# Patient Record
Sex: FEMALE
[De-identification: names, ages, dates, MRNs, and addresses within clinical notes are randomized per-mention and may not be internally consistent; named-entity substitution may affect disease eponyms.]

---

## 2018-06-11 ENCOUNTER — HOSPITAL ENCOUNTER (EMERGENCY)
Dept: HOSPITAL 42 - ED | Age: 28
Discharge: HOME | End: 2018-06-11
Payer: COMMERCIAL

## 2018-06-11 VITALS — OXYGEN SATURATION: 100 % | RESPIRATION RATE: 18 BRPM

## 2018-06-11 VITALS — TEMPERATURE: 98.2 F | DIASTOLIC BLOOD PRESSURE: 72 MMHG | HEART RATE: 80 BPM | SYSTOLIC BLOOD PRESSURE: 125 MMHG

## 2018-06-11 DIAGNOSIS — N39.0: Primary | ICD-10-CM

## 2018-06-11 LAB
ALBUMIN SERPL-MCNC: 4.9 G/DL (ref 3–4.8)
ALBUMIN/GLOB SERPL: 1.4 {RATIO} (ref 1.1–1.8)
ALT SERPL-CCNC: 34 U/L (ref 7–56)
APPEARANCE UR: CLEAR
AST SERPL-CCNC: 50 U/L (ref 14–36)
BACTERIA #/AREA URNS HPF: (no result) /[HPF]
BASOPHILS # BLD AUTO: 0.01 K/MM3 (ref 0–2)
BASOPHILS NFR BLD: 0.1 % (ref 0–3)
BILIRUB UR-MCNC: NEGATIVE MG/DL
BUN SERPL-MCNC: 6 MG/DL (ref 7–21)
CALCIUM SERPL-MCNC: 9.5 MG/DL (ref 8.4–10.5)
COLOR UR: YELLOW
EOSINOPHIL # BLD: 0 10*3/UL (ref 0–0.7)
EOSINOPHIL NFR BLD: 0.3 % (ref 1.5–5)
ERYTHROCYTE [DISTWIDTH] IN BLOOD BY AUTOMATED COUNT: 13.5 % (ref 11.5–14.5)
GFR NON-AFRICAN AMERICAN: > 60
GLUCOSE UR STRIP-MCNC: NEGATIVE MG/DL
GRANULOCYTES # BLD: 5.09 10*3/UL (ref 1.4–6.5)
GRANULOCYTES NFR BLD: 57.5 % (ref 50–68)
HGB BLD-MCNC: 13.7 G/DL (ref 12–16)
LEUKOCYTE ESTERASE UR-ACNC: (no result) LEU/UL
LIPASE SERPL-CCNC: 35 U/L (ref 23–300)
LYMPHOCYTES # BLD: 3.4 10*3/UL (ref 1.2–3.4)
LYMPHOCYTES NFR BLD AUTO: 38.1 % (ref 22–35)
MCH RBC QN AUTO: 26.2 PG (ref 25–35)
MCHC RBC AUTO-ENTMCNC: 32.4 G/DL (ref 31–37)
MCV RBC AUTO: 80.9 FL (ref 80–105)
MONOCYTES # BLD AUTO: 0.4 10*3/UL (ref 0.1–0.6)
MONOCYTES NFR BLD: 4 % (ref 1–6)
PH UR STRIP: 6 [PH] (ref 4.7–8)
PLATELET # BLD: 335 10^3/UL (ref 120–450)
PMV BLD AUTO: 10.4 FL (ref 7–11)
PROT UR STRIP-MCNC: NEGATIVE MG/DL
RBC # BLD AUTO: 5.23 10^6/UL (ref 3.5–6.1)
RBC # UR STRIP: NEGATIVE /UL
RBC #/AREA URNS HPF: (no result) /HPF (ref 0–2)
SP GR UR STRIP: <= 1.005 (ref 1–1.03)
UROBILINOGEN UR STRIP-ACNC: 0.2 E.U./DL
WBC # BLD AUTO: 8.9 10^3/UL (ref 4.5–11)

## 2018-06-11 PROCEDURE — 96375 TX/PRO/DX INJ NEW DRUG ADDON: CPT

## 2018-06-11 PROCEDURE — 96374 THER/PROPH/DIAG INJ IV PUSH: CPT

## 2018-06-11 PROCEDURE — 83735 ASSAY OF MAGNESIUM: CPT

## 2018-06-11 PROCEDURE — 83690 ASSAY OF LIPASE: CPT

## 2018-06-11 PROCEDURE — 85025 COMPLETE CBC W/AUTO DIFF WBC: CPT

## 2018-06-11 PROCEDURE — 99284 EMERGENCY DEPT VISIT MOD MDM: CPT

## 2018-06-11 PROCEDURE — 81001 URINALYSIS AUTO W/SCOPE: CPT

## 2018-06-11 PROCEDURE — 87086 URINE CULTURE/COLONY COUNT: CPT

## 2018-06-11 PROCEDURE — 80053 COMPREHEN METABOLIC PANEL: CPT

## 2018-06-11 PROCEDURE — 84100 ASSAY OF PHOSPHORUS: CPT

## 2018-06-11 NOTE — ED PDOC
Arrival/HPI





- General


Chief Complaint: GI Problem


Time Seen by Provider: 06/11/18 11:08


Historian: Patient





- History of Present Illness


Narrative History of Present Illness (Text): 


06/11/18 11:20


27 year old female presents to the Emergency department complaining of nausea 

for 3 days. Nausea is worsened when the patient eats. Patient also complains of 

associated lightheadedness and fatigue. Last menstrual period started 06/01 but 

the patient reports it was shorter than usual. Patient denies any abdominal pain

, fever, chills, chest pain, shortness of breath, vomiting, diarrhea, urinary 

symptoms, back pain, neck pain, or any other complaints.





06/11/18 13:47





Time/Duration: < week (3 days)


Symptom Onset: Gradual


Symptom Course: Unchanged


Context: Home





Past Medical History





- Provider Review


Nursing Documentation Reviewed: Yes





- Psychiatric


Hx Substance Use: No





Family/Social History





- Physician Review


Nursing Documentation Reviewed: Yes


Family/Social History: Unknown Family HX


Smoking Status: Never Smoked


Hx Alcohol Use: No


Hx Substance Use: No





Allergies/Home Meds


Allergies/Adverse Reactions: 


Allergies





No Known Allergies Allergy (Verified 06/11/18 10:39)


 











Review of Systems





- Review of Systems


Constitutional: Fatigue.  absent: Weight Change, Fevers, Night Sweats


Eyes: absent: Vision Changes


ENT: absent: Sore Throat, Rhinorrhea


Respiratory: absent: SOB, Cough


Cardiovascular: absent: Chest Pain


Gastrointestinal: Nausea.  absent: Abdominal Pain, Constipation, Diarrhea, 

Vomiting


Genitourinary Female: absent: Dysuria, Vaginal Bleeding, Vaginal Discharge


Musculoskeletal: absent: Back Pain, Neck Pain


Skin: absent: Rash


Neurological: Other (lightheaded).  absent: Headache, Dizziness, Focal Weakness

, Gait Changes, Speech Changes, Facial Droop, Disequilibrium


Endocrine: absent: Diaphoresis


Psychiatric: absent: Anxiety, Depression





Physical Exam


Vital Signs Reviewed: Yes


Vital Signs











  Temp Pulse Resp BP Pulse Ox


 


 06/11/18 10:36  98.3 F  74  18  121/79  100











Temperature: Afebrile


Blood Pressure: Normal


Pulse: Regular


Respiratory Rate: Normal


Appearance: Positive for: Well-Appearing, Non-Toxic, Comfortable


Pain Distress: None


Mental Status: Positive for: Alert and Oriented X 3





- Systems Exam


Head: Present: Atraumatic, Normocephalic


Pupils: Present: PERRL


Extroacular Muscles: Present: EOMI


Conjunctiva: Present: Normal


Ears: Present: Normal, NORMAL TM.  No: Erythema


Mouth: Present: Moist Mucous Membranes


Pharnyx: Present: Normal.  No: ERYTHEMA, EXUDATE


Neck: Present: Normal Range of Motion


Respiratory/Chest: Present: Clear to Auscultation, Good Air Exchange.  No: 

Respiratory Distress, Accessory Muscle Use


Cardiovascular: Present: Regular Rate and Rhythm, Normal S1, S2.  No: Murmurs


Abdomen: No: Tenderness, Distention, Peritoneal Signs


Back: Present: Normal Inspection


Upper Extremity: Present: Normal Inspection.  No: Cyanosis, Edema


Lower Extremity: Present: Normal Inspection.  No: Edema


Neurological: Present: GCS=15, CN II-XII Intact, Speech Normal


Skin: Present: Warm, Dry, Normal Color.  No: Rashes


Psychiatric: Present: Alert, Oriented x 3, Normal Insight, Normal Concentration





Medical Decision Making


ED Course and Treatment: 


06/11/18 11:25


Impression:


27 year old female presents to the Emergency department complaining of nausea 

for 3 days.





Plan:


-- Urinalysis


-- Labs


-- Pepcid, Zofran, and Sodium Chloride IV fluids


-- Reassess and disposition





Progress Notes:





06/11/18 13:09


Symptoms improved after IVF.  Now complaining of occasional dysuria.  Will dc 

with antibiotics.  She was instructed to follow-up with GI.  Abdomen soft NT/ND 

on reevaluation


06/11/18 13:46








- Lab Interpretations


Lab Results: 








 06/11/18 11:30 





 06/11/18 11:30 





 Lab Results





06/11/18 11:30: Sodium 143, Potassium 4.4, Chloride 105, Carbon Dioxide 24, 

Anion Gap 18, BUN 6 L, Creatinine 0.6 L, Est GFR ( Amer) > 60, Est GFR (

Non-Af Amer) > 60, Random Glucose 96, Calcium 9.5, Phosphorus 2.7, Magnesium 2.2

, Total Bilirubin 0.4, AST 50 H, ALT 34, Alkaline Phosphatase 76, Total Protein 

8.3, Albumin 4.9 H, Globulin 3.4, Albumin/Globulin Ratio 1.4, Lipase 35


06/11/18 11:30: Urine Color Yellow, Urine Appearance Clear, Urine pH 6.0, Ur 

Specific Gravity <= 1.005, Urine Protein Negative, Urine Glucose (UA) Negative, 

Urine Ketones Negative, Urine Blood Negative, Urine Nitrate Negative, Urine 

Bilirubin Negative, Urine Urobilinogen 0.2, Ur Leukocyte Esterase Trace H, 

Urine RBC 0 - 2, Urine WBC 2 - 5, Ur Epithelial Cells 4 - 5, Urine Bacteria Few


06/11/18 11:30: WBC 8.9, RBC 5.23, Hgb 13.7, Hct 42.3, MCV 80.9, MCH 26.2, MCHC 

32.4, RDW 13.5, Plt Count 335, MPV 10.4, Gran % 57.5, Lymph % (Auto) 38.1 H, 

Mono % (Auto) 4.0, Eos % (Auto) 0.3 L, Baso % (Auto) 0.1, Gran # 5.09, Lymph # (

Auto) 3.4, Mono # (Auto) 0.4, Eos # (Auto) 0.0, Baso # (Auto) 0.01











- Medication Orders


Current Medication Orders: 











Discontinued Medications





Famotidine (Pepcid)  20 mg IVP STAT STA


   Stop: 06/11/18 11:13


   Last Admin: 06/11/18 11:36  Dose: 20 mg





IVP Administration


 Document     06/11/18 11:36  HI  (Rec: 06/11/18 11:36  [id number]


     Charges for Administration


      # of IVP Administrations                   1





Sodium Chloride (Sodium Chloride 0.9%)  1,000 mls @ 999 mls/hr IV .Q1H1M STA


   Stop: 06/11/18 12:11


   Last Admin: 06/11/18 11:36  Dose: 999 mls/hr





eMAR Start Stop


 Document     06/11/18 11:36  HI  (Rec: 06/11/18 11:36  [id number]


     Intravenous Solution


      Start Date                                 06/11/18


      Start Time                                 11:36





Nitrofurantoin Macrocrystals (Macrobid)  100 mg PO STAT STA


   PRN Reason: Protocol


   Stop: 06/11/18 13:16


   Last Admin: 06/11/18 13:35  Dose: 100 mg





Ondansetron HCl (Zofran Inj)  4 mg IVP STAT STA


   Stop: 06/11/18 11:13


   Last Admin: 06/11/18 11:36  Dose: 4 mg





IVP Administration


 Document     06/11/18 11:36  HI  (Rec: 06/11/18 11:36  HI  [id number])


     Charges for Administration


      # of IVP Administrations                   1














- Scribe Statement


The provider has reviewed the documentation as recorded by the Scribe


Jd Juarez





All medical record entries made by the Scribe were at my direction and 

personally dictated by me. I have reviewed the chart and agree that the record 

accurately reflects my personal performance of the history, physical exam, 

medical decision making, and the department course for this patient. I have 

also personally directed, reviewed, and agree with the discharge instructions 

and disposition.





Disposition/Present on Arrival





- Present on Arrival


Any Indicators Present on Arrival: No


History of DVT/PE: No


History of Uncontrolled Diabetes: No


Urinary Catheter: No


History of Decub. Ulcer: No


History Surgical Site Infection Following: None





- Disposition


Have Diagnosis and Disposition been Completed?: Yes


Diagnosis: 


 UTI (urinary tract infection)





Disposition: HOME/ ROUTINE


Disposition Time: 13:16


Patient Plan: Discharge


Patient Problems: 


 Current Active Problems











Problem Status Onset


 


UTI (urinary tract infection) Acute  











Condition: GOOD


Discharge Instructions (ExitCare):  Urinary Tract Infections in Adults, Urinary 

Tract Infection, Adult (DC)


Additional Instructions: 


Follow-up with PMD within 2 days.  Return to ED if condition worsens.  Take 

full course of antibiotics.


Prescriptions: 


Nitrofurantoin Macrocrystals [Macrobid] 100 mg PO BID #14 cap


Forms:  [employer] (English)

## 2019-03-12 ENCOUNTER — HOSPITAL ENCOUNTER (EMERGENCY)
Dept: HOSPITAL 42 - ED | Age: 29
Discharge: HOME | End: 2019-03-12
Payer: COMMERCIAL

## 2019-03-12 VITALS
RESPIRATION RATE: 18 BRPM | TEMPERATURE: 97.9 F | SYSTOLIC BLOOD PRESSURE: 115 MMHG | HEART RATE: 74 BPM | OXYGEN SATURATION: 100 % | DIASTOLIC BLOOD PRESSURE: 70 MMHG

## 2019-03-12 DIAGNOSIS — R19.7: ICD-10-CM

## 2019-03-12 DIAGNOSIS — R11.2: Primary | ICD-10-CM

## 2019-03-12 LAB
ALBUMIN SERPL-MCNC: 4.7 G/DL (ref 3–4.8)
ALBUMIN/GLOB SERPL: 1.3 {RATIO} (ref 1.1–1.8)
ALT SERPL-CCNC: 30 U/L (ref 7–56)
APPEARANCE UR: CLEAR
APTT BLD: 30.6 SECONDS (ref 26.9–38.3)
AST SERPL-CCNC: 34 U/L (ref 14–36)
BACTERIA #/AREA URNS HPF: (no result) /HPF
BASOPHILS # BLD AUTO: 0 K/MM3 (ref 0–2)
BASOPHILS NFR BLD: 0 % (ref 0–3)
BILIRUB UR-MCNC: NEGATIVE MG/DL
BUN SERPL-MCNC: 8 MG/DL (ref 7–21)
CALCIUM SERPL-MCNC: 9.4 MG/DL (ref 8.4–10.5)
COLOR UR: YELLOW
EOSINOPHIL # BLD: 0.1 10*3/UL (ref 0–0.7)
EOSINOPHIL NFR BLD: 0.7 % (ref 1.5–5)
ERYTHROCYTE [DISTWIDTH] IN BLOOD BY AUTOMATED COUNT: 13.3 % (ref 11.5–14.5)
GFR NON-AFRICAN AMERICAN: > 60
GLUCOSE UR STRIP-MCNC: NEGATIVE MG/DL
HCG,QUALITATIVE URINE: NEGATIVE
HGB BLD-MCNC: 13.6 G/DL (ref 12–16)
INR PPP: 1.13
LEUKOCYTE ESTERASE UR-ACNC: (no result) LEU/UL
LIPASE SERPL-CCNC: 52 U/L (ref 23–300)
LYMPHOCYTES # BLD: 1.8 10*3/UL (ref 1.2–3.4)
LYMPHOCYTES NFR BLD AUTO: 19.5 % (ref 22–35)
MCH RBC QN AUTO: 26.6 PG (ref 25–35)
MCHC RBC AUTO-ENTMCNC: 32.2 G/DL (ref 31–37)
MCV RBC AUTO: 82.4 FL (ref 80–105)
MONOCYTES # BLD AUTO: 0.5 10*3/UL (ref 0.1–0.6)
MONOCYTES NFR BLD: 5.6 % (ref 1–6)
PH UR STRIP: 6 [PH] (ref 4.7–8)
PLATELET # BLD: 360 10^3/UL (ref 120–450)
PMV BLD AUTO: 10.2 FL (ref 7–11)
PROT UR STRIP-MCNC: NEGATIVE MG/DL
PROTHROMBIN TIME: 12.8 SECONDS (ref 9.4–12.5)
RBC # BLD AUTO: 5.12 10^6/UL (ref 3.5–6.1)
RBC # UR STRIP: NEGATIVE /UL
RBC #/AREA URNS HPF: (no result) /HPF (ref 0–2)
SP GR UR STRIP: 1.01 (ref 1–1.03)
UROBILINOGEN UR STRIP-ACNC: 0.2 E.U./DL
WBC # BLD AUTO: 9.4 10^3/UL (ref 4.5–11)

## 2019-03-12 PROCEDURE — 84703 CHORIONIC GONADOTROPIN ASSAY: CPT

## 2019-03-12 PROCEDURE — 87086 URINE CULTURE/COLONY COUNT: CPT

## 2019-03-12 PROCEDURE — 99284 EMERGENCY DEPT VISIT MOD MDM: CPT

## 2019-03-12 PROCEDURE — 85610 PROTHROMBIN TIME: CPT

## 2019-03-12 PROCEDURE — 85025 COMPLETE CBC W/AUTO DIFF WBC: CPT

## 2019-03-12 PROCEDURE — 80053 COMPREHEN METABOLIC PANEL: CPT

## 2019-03-12 PROCEDURE — 85730 THROMBOPLASTIN TIME PARTIAL: CPT

## 2019-03-12 PROCEDURE — 96375 TX/PRO/DX INJ NEW DRUG ADDON: CPT

## 2019-03-12 PROCEDURE — 96361 HYDRATE IV INFUSION ADD-ON: CPT

## 2019-03-12 PROCEDURE — 83735 ASSAY OF MAGNESIUM: CPT

## 2019-03-12 PROCEDURE — 81001 URINALYSIS AUTO W/SCOPE: CPT

## 2019-03-12 PROCEDURE — 83690 ASSAY OF LIPASE: CPT

## 2019-03-12 PROCEDURE — 81025 URINE PREGNANCY TEST: CPT

## 2019-03-12 PROCEDURE — 96374 THER/PROPH/DIAG INJ IV PUSH: CPT

## 2019-03-12 NOTE — ED PDOC
Arrival/HPI





- General


Chief Complaint: GI Problem


Time Seen by Provider: 03/12/19 09:03


Historian: Patient





- History of Present Illness


Narrative History of Present Illness (Text): 





03/12/19 10:00


27 y/o female, with no significant PMH, presents to the ED for evaluation of 

nausea, vomiting and diarrhea x 3 days. Patient describes associated generalized

abdominal cramping, relieved with bowel movements. Patient reports her last 

episode of emesis was yesterday, non-bloody and non-bilious. Patient reports 2 

episodes non-bloody diarrhea since onset and decreased PO intake secondary to 

nausea. Patient denies any recent changes in diet, sick contact or any recent 

travel. Patient denies any other associated somatic complaints. LMP 2/14. 

Patient denies any fever, chills, back pain, neck pain/stiffness, urinary 

symptoms, vaginal bleeding/discharge, headache, dizziness, or any other 

complaints. 








Time/Duration: < week


Symptom Onset: Gradual


Symptom Course: Unchanged


Activities at Onset: Light


Context: Home





Past Medical History





- Provider Review


Nursing Documentation Reviewed: Yes





- Psychiatric


Hx Psychophysiologic Disorder: No


Hx Substance Use: No





Family/Social History





- Physician Review


Nursing Documentation Reviewed: Yes


Family/Social History: Unknown Family HX


Smoking Status: Never Smoked


Hx Alcohol Use: No


Hx Substance Use: No





Allergies/Home Meds


Allergies/Adverse Reactions: 


Allergies





shrimp Allergy (Verified 03/12/19 09:19)


   ITCHING








Home Medications: 


                                    Home Meds











 Medication  Instructions  Recorded  Confirmed


 


Levonorgestrel-Ethin Estradiol 1 tab PO DAILY 03/12/19 03/12/19





[Larissia-28 Tablet]   














Review of Systems





- Physician Review


All systems were reviewed & negative as marked: Yes





- Review of Systems


Constitutional: Normal.  absent: Fevers


Eyes: absent: Vision Changes


ENT: Normal.  absent: Hearing Changes


Respiratory: Normal.  absent: SOB, Cough


Cardiovascular: Normal.  absent: Chest Pain, Palpitations, Syncope


Gastrointestinal: Abdominal Pain, Diarrhea, Nausea, Vomiting, Appetite Changes. 

absent: Hematochezia, Hematemesis


Genitourinary Female: Normal.  absent: Dysuria, Frequency, Urine Output Changes,

Vaginal Bleeding, Vaginal Discharge


Musculoskeletal: Normal.  absent: Back Pain, Neck Pain


Skin: Normal.  absent: Rash


Neurological: Normal.  absent: Headache, Dizziness


Endocrine: Normal.  absent: Diaphoresis


Hemo/Lymphatic: Normal


Psychiatric: Normal.  absent: Anxiety





Physical Exam


Vital Signs Reviewed: Yes





Vital Signs











  Temp Pulse Resp BP Pulse Ox


 


 03/12/19 09:20  98.6 F  77  16  119/78  99











Temperature: Afebrile


Blood Pressure: Normal


Pulse: Regular


Respiratory Rate: Normal


Appearance: Positive for: Well-Appearing, Non-Toxic, Comfortable


Pain Distress: None


Mental Status: Positive for: Alert and Oriented X 3





- Systems Exam


Head: Present: Atraumatic, Normocephalic


Pupils: Present: PERRL


Extroacular Muscles: Present: EOMI


Conjunctiva: Present: Normal


Mouth: Present: Moist Mucous Membranes


Neck: Present: Normal Range of Motion.  No: MIDLINE TENDERNESS, Paraspinal 

Tenderness


Respiratory/Chest: Present: Clear to Auscultation, Good Air Exchange.  No: 

Respiratory Distress, Accessory Muscle Use


Cardiovascular: Present: Regular Rate and Rhythm, Normal S1, S2.  No: Murmurs


Abdomen: Present: Normal Bowel Sounds.  No: Tenderness, Distention, Peritoneal 

Signs, Rebound, Guarding


Back: Present: Normal Inspection.  No: CVA Tenderness, Midline Tenderness, 

Paraspinal Tenderness


Upper Extremity: Present: Normal Inspection, Normal ROM, NORMAL PULSES, 

Neurovascularly Intact, Capillary Refill < 2s.  No: Cyanosis, Edema, Temperature

Abnormalties


Lower Extremity: Present: Normal Inspection, NORMAL PULSES, Normal ROM, Neur

ovascularly Intact, Capillary Refill < 2 s.  No: Edema, Temperature Abnormalties


Neurological: Present: GCS=15, CN II-XII Intact, Speech Normal, Motor Func 

Grossly Intact, Normal Sensory Function, Gait Normal


Skin: Present: Warm, Dry, Normal Color.  No: Rashes


Psychiatric: Present: Alert, Oriented x 3, Normal Insight, Normal Concentration,

Normal Affect, Normal Mood





Medical Decision Making


ED Course and Treatment: 





03/12/19 10:00


Initial Plan:


* CBC, CMP


* Lipase


* Coags


* UA, hcG


* Pepcid


* IV fluids


* Zofran





11:19


On reevaluation, nausea resolved. Will finish IVF.


Labwork reviewed, unremarkable.


UA with trace leuk esterase, WBC, epithelial cells. Spoke with patient, will 

wait for urine culture to determine if antibiotics are indicated.





12:50


Pt feeling much better, will discharge home with medications and recommend PMD 

followup, rest, increase in fluids.





Diagnostic testing results and plan of care discussed with patient. Strict 

instructions given regarding prescription use, importance of followup, and 

signs/symptoms to return to ER including fever, chills, or any other 

new/worsening symptoms. Pt verbalized understanding of discussion. Patient is 

A&Ox3, ambulating with steady gait, with vital signs stable for discharge.








- Lab Interpretations


Lab Results: 














                                 03/12/19 10:40 





                                 03/12/19 10:40 





                                   Lab Results





03/12/19 10:40: Sodium 138, Potassium 4.1, Chloride 104, Carbon Dioxide 24, 

Anion Gap 13, BUN 8, Creatinine 0.6 L, Est GFR ( Amer) > 60, Est GFR 

(Non-Af Amer) > 60, Random Glucose 90, Calcium 9.4, Magnesium 2.0, Total 

Bilirubin 0.3, AST 34, ALT 30, Alkaline Phosphatase 76, Total Protein 8.4 H, 

Albumin 4.7, Globulin 3.6, Albumin/Globulin Ratio 1.3, Lipase 52


03/12/19 10:40: Urine Color Yellow, Urine Appearance Clear, Urine pH 6.0, Ur 

Specific Gravity 1.010, Urine Protein Negative, Urine Glucose (UA) Negative, 

Urine Ketones Negative, Urine Blood Negative, Urine Nitrate Negative, Urine 

Bilirubin Negative, Urine Urobilinogen 0.2, Ur Leukocyte Esterase Small H, Urine

RBC 0 - 2, Urine WBC 5 - 10 H, Ur Epithelial Cells 6 - 8 H, Urine Bacteria Many,

Urine HCG, Qual Negative


03/12/19 10:40: PT 12.8 H, INR 1.13, APTT 30.6


03/12/19 10:40: WBC 9.4, RBC 5.12, Hgb 13.6, Hct 42.2, MCV 82.4, MCH 26.6, MCHC 

32.2, RDW 13.3, Plt Count 360, MPV 10.2, Neut % (Auto) 74.2 H, Lymph % (Auto) 

19.5 L, Mono % (Auto) 5.6, Eos % (Auto) 0.7 L, Baso % (Auto) 0.0, Lymph # (Auto)

1.8, Mono # (Auto) 0.5, Eos # (Auto) 0.1, Baso # (Auto) 0.00, Absolute Neuts 

(auto) 6.94 H








I have reviewed the lab results: Yes





- Medication Orders


Current Medication Orders: 











Sodium Chloride (Sodium Chloride 0.9%)  1,000 mls @ 1,000 mls/hr IV .Q1H STA


   Stop: 03/12/19 10:59





Discontinued Medications





Famotidine (Pepcid)  20 mg IVP STAT STA


   Stop: 03/12/19 10:01


Ondansetron HCl (Zofran Inj)  4 mg IVP STAT STA


   Stop: 03/12/19 10:01











- PA / NP / Resident Statement


MD/DO has reviewed & agrees with the documentation as recorded.





- Scribe Statement


The provider has reviewed the documentation as recorded by the Scribe


Nicholas Laboy.





All medical record entries made by the Scribe were at my direction and 

personally dictated by me. I have reviewed the chart and agree that the record 

accurately reflects my personal performance of the history, physical exam, 

medical decision making, and the department course for this patient. I have also

personally directed, reviewed, and agree with the discharge instructions and d

isposition.








Disposition/Present on Arrival





- Present on Arrival


Any Indicators Present on Arrival: No


History of DVT/PE: No


History of Uncontrolled Diabetes: No


Urinary Catheter: No


History of Decub. Ulcer: No


History Surgical Site Infection Following: None





- Disposition


Have Diagnosis and Disposition been Completed?: Yes


Diagnosis: 


 Nausea vomiting and diarrhea





Disposition: HOME/ ROUTINE


Disposition Time: 11:21


Patient Plan: Discharge


Patient Problems: 


                             Current Active Problems











Problem Status Onset


 


Nausea vomiting and diarrhea Acute 











Condition: IMPROVED


Discharge Instructions (ExitCare):  Acute Abdomen (Belly Pain), Viral 

Gastroenteritis


Print Language: Yi


Additional Instructions: 


Pepcid cada 12 horas segn sea necesario para la indigestin.


Bentyl cada 8 horas segn sea necesario para los clicos


Zofran cada 8 horas segn sea necesario para las nuseas.


Aumentar los fluidos


Seguimiento con mdico primario en 2 tyson.


Regrese a la russell de emergencias con cualquier sntoma nuevo o que empeore


Prescriptions: 


Dicyclomine [Bentyl] 10 mg PO Q8 PRN #9 cap


 PRN Reason: abdominal pain


Famotidine [Pepcid] 20 mg PO Q12 PRN #14 tab


 PRN Reason: Indigestion


Ondansetron ODT [Zofran ODT] 4 mg PO Q8H PRN #6 odt


 PRN Reason: Nausea/Vomiting


Referrals: 


Carlitos Harris MD [Staff Provider] - Follow up with primary


Addie Dukes MD [Medical Doctor] - Follow up with primary


[facility] [Outside] - Follow up with primary


Forms:  [employer] (English), WORK NOTE